# Patient Record
Sex: FEMALE | Race: BLACK OR AFRICAN AMERICAN | NOT HISPANIC OR LATINO | Employment: FULL TIME | URBAN - NONMETROPOLITAN AREA
[De-identification: names, ages, dates, MRNs, and addresses within clinical notes are randomized per-mention and may not be internally consistent; named-entity substitution may affect disease eponyms.]

---

## 2018-01-24 ENCOUNTER — OFFICE VISIT CONVERTED (OUTPATIENT)
Dept: FAMILY MEDICINE CLINIC | Age: 53
End: 2018-01-24
Attending: NURSE PRACTITIONER

## 2020-11-03 ENCOUNTER — HOSPITAL ENCOUNTER (OUTPATIENT)
Dept: OTHER | Facility: HOSPITAL | Age: 55
Discharge: HOME | End: 2020-11-03
Attending: FAMILY MEDICINE

## 2020-11-06 LAB — SARS-COV-2 RNA SPEC QL NAA+PROBE: NOT DETECTED

## 2021-03-01 ENCOUNTER — OFFICE VISIT (OUTPATIENT)
Dept: ORTHOPEDIC SURGERY | Facility: CLINIC | Age: 56
End: 2021-03-01

## 2021-03-01 VITALS — TEMPERATURE: 95.9 F | BODY MASS INDEX: 39.93 KG/M2 | WEIGHT: 217 LBS | HEIGHT: 62 IN

## 2021-03-01 DIAGNOSIS — M17.12 PRIMARY OSTEOARTHRITIS OF LEFT KNEE: Primary | Chronic | ICD-10-CM

## 2021-03-01 PROCEDURE — 99204 OFFICE O/P NEW MOD 45 MIN: CPT | Performed by: PHYSICIAN ASSISTANT

## 2021-03-01 RX ORDER — LORATADINE 10 MG/1
10 TABLET ORAL DAILY
COMMUNITY

## 2021-03-01 RX ORDER — MULTIPLE VITAMINS W/ MINERALS TAB 9MG-400MCG
1 TAB ORAL DAILY
COMMUNITY

## 2021-03-01 NOTE — PROGRESS NOTES
"Chief Complaint  Pain of the Left Knee and Establish Care    Subjective    History of Present Illness      Oriana Rogers is a 65 y.o. female who presents to [facility] for complaint of  Knee Pain:   Patient complains of left knee pain. There is no specific injury.  The pain began 6 months ago. The pain started as pain/tightness in the calf and later moving into the posterior aspect of the knee.   The pain is located over medial and posterior aspect.    She describes the symptoms as aching, shooting, stabbing and throbbing.   Symptoms improve with rest, ice, medication: aleve used and beneficial, the use of a brace/sleeve.   The symptoms are worse with stair climbing, sitting, working, walking.   The knee has given out or felt unstable.   She has been treated with a round of steroids which did not provide much relief. She reports feeling stiff and walking with a limp recently.   She has done PT for approximately 1 months with some improvement. She also reports swelling.  She is employed at [email or web address] and stands and walks all day.             Objective   Vital Signs:   Temp 95.9 °F (35.5 °C)   Ht 157.5 cm (62\")   Wt 98.4 kg (217 lb)   BMI 39.69 kg/m²     Physical Exam  Vitals signs and nursing note reviewed.   Constitutional:       Appearance: Normal appearance.   Pulmonary:      Effort: Pulmonary effort is normal.   Skin:     General: Skin is warm and dry.      Capillary Refill: Capillary refill takes less than 2 seconds.   Neurological:      General: No focal deficit present.      Mental Status: He is alert and oriented to person, place, and time. Mental status is at baseline.   Psychiatric:         Mood and Affect: Mood normal.         Behavior: Behavior normal.         Thought Content: Thought content normal.         Judgment: Judgment normal.     Ortho Exam   LEFT knee  There is mild-moderate joint line tenderness at the medial aspect of the knee.   Positive for varus " orientation of the knee.   Positive for crepitus throughout range of motion.   Negative for effusion.  Positive patellar grind test.   Negative Lachman test.    Negative anterior and posterior drawer.  Range of motion in extension and flexion is: 0-120 degrees.  Neurovascular status is intact.    Dorsalis pedis and posterior tibial artery pulses are palpable.    Common peroneal nerve function is well preserved.  Gait is cautious and antalgic.       Result Review :   Radiologic studies - see below for interpretation  LEFT knee xrays 3views were performed at [facility] on 9/29/21. These images were independently viewed and interpreted by myself, my impression as follows:   · Mild to moderate medial joint space narrowing, lateral joint space with mild degenerative change, patellofemoral also with mild degenerative change                    Assessment   Assessment and Plan    Problem List Items Addressed This Visit        Musculoskeletal and Injuries    Primary osteoarthritis of left knee - Primary    Relevant Orders    MRI Knee Left Without Contrast          Follow Up   · Discussion of any imaging in detail. Discussion of orthopaedic goals.  · Risk, benefits, and merits of treatment alternatives reviewed with the patient. Treatment alternatives include: oral anti-inflammatories/NSAID, intra-articular steroid injection, intra-articular visco supplementation, bracing and further imaging/testing. She would like to proceed with further testing with MRI.  · Schedule MRI to further assess articular cartilage loss/surgery planning.  · Ice, heat, and/or modalities as beneficial  · Patient is encouraged to call or return for any issues or concerns.  · Patient was given instructions and counseling regarding her condition or for health maintenance advice. Please see specific information pulled into the AVS if appropriate.     Ivan Palacios PA-C   Date of Encounter: 3/1/2021   Electronically signed by  Ivan Palacios PA-C, 03/01/21, 6:02 AM EST.     EMR Dragon/Transcription disclaimer:  Much of this encounter note is an electronic transcription/translation of spoken language to printed text. The electronic translation of spoken language may permit erroneous, or at times, nonsensical words or phrases to be inadvertently transcribed; Although I have reviewed the note for such errors, some may still exist.

## 2021-03-23 ENCOUNTER — HOSPITAL ENCOUNTER (OUTPATIENT)
Dept: OTHER | Facility: HOSPITAL | Age: 56
Discharge: HOME | End: 2021-03-23
Attending: PHYSICIAN ASSISTANT

## 2021-03-26 ENCOUNTER — OFFICE VISIT (OUTPATIENT)
Dept: ORTHOPEDIC SURGERY | Facility: CLINIC | Age: 56
End: 2021-03-26

## 2021-03-26 DIAGNOSIS — M17.12 PRIMARY OSTEOARTHRITIS OF LEFT KNEE: Primary | ICD-10-CM

## 2021-03-26 DIAGNOSIS — S83.242A OTHER TEAR OF MEDIAL MENISCUS OF LEFT KNEE, UNSPECIFIED WHETHER OLD OR CURRENT TEAR, INITIAL ENCOUNTER: ICD-10-CM

## 2021-03-26 PROCEDURE — 99442 PR PHYS/QHP TELEPHONE EVALUATION 11-20 MIN: CPT | Performed by: PHYSICIAN ASSISTANT

## 2021-03-26 NOTE — PROGRESS NOTES
You have chosen to receive care through a telephone visit. Do you consent to use a telephone visit for your medical care today? Yes     Chief Complaint  Results of the Left Knee    Subjective    History of Present Illness      Oriana Rogers is a 65 y.o. female who presents to [facility] via telephone visit for follow-up on left knee pain and MRI results of left knee.  I initially saw her 3/1/2021 for complaint of pain that began 6 months prior and started as pain and tightness in the calf with the later moving to the posterior aspect of the knee.  She reported aching and stabbing pain at the medial and posterior aspect of the knee.  She also reported knee giving out from underneath of her.  She has been treated with steroids which did not provide much relief and is done PT with some improvement.         Objective   Vital Signs:   There were no vitals taken for this visit.    Physical Exam  Musculoskeletal:      Comments: See detailed ortho exam from prior visit   Neurological:      Mental Status: She is alert and oriented to person, place, and time. Mental status is at baseline.   Psychiatric:         Mood and Affect: Mood normal.         Behavior: Behavior normal.         Thought Content: Thought content normal.         Judgment: Judgment normal.       Ortho Exam   RIGHT knee  There is mild-moderate joint line tenderness at the medial aspect of the knee.   Positive for varus orientation of the knee.   Positive for crepitus throughout range of motion.   Negative for effusion.  Positive patellar grind test.   Negative Lachman test.    Negative anterior and posterior drawer.  Range of motion in extension and flexion is: 0-120 degrees.  Neurovascular status is intact.    Dorsalis pedis and posterior tibial artery pulses are palpable.    Common peroneal nerve function is well preserved.  Gait is cautious and antalgic.   The above exam is historical and is used as a reference for this  telephone visit.      Result Review :   Radiologic studies - see below for interpretation  Reviewed MRI report of left knee, performed at  [facility] on 3/23/21, summary of impression below:  · Moderate joint effusion with diffuse synovial thickening and intra-articular debris  · Lobulated popliteal cyst and suggestive of partial cyst rupture  · Medial meniscus shows full-thickness radial tear through posterior root with a 3 to 4 mm medial protrusion of the meniscal body.  · Lateral meniscus intact  · Cruciate and collateral ligaments are intact  · Extensor mechanism intact  · Patellofemoral compartment shows diffuse grade 2-3 chondromalacia with multifocal full-thickness near full-thickness chondral fissuring at medial patellar facet and medial and central trochlea  · Medial compartment shows diffuse grade II-III chondromalacia with near full-thickness chondral thinning at the weightbearing portions of medial femoral condyle and medial tibial plateau  · Lateral compartment articular cartilage fairly maintained  · At the trochlea there are mild multifocal subchondral cystic changes  · There is tricompartmental osteophyte formation      Assessment   Assessment and Plan    Problem List Items Addressed This Visit        Musculoskeletal and Injuries    Primary osteoarthritis of left knee - Primary    Relevant Orders    External [facility] Surgical/Procedural Request    Urinalysis With Culture If Indicated -    Tear of medial meniscus of left knee          Follow Up   · Discussion of any imaging in detail. Discussion of orthopaedic goals.  · Risk, benefits, and merits of treatment alternatives reviewed with the patient. Treatment alternatives include: intra-articular steroid injection, intra-articular visco supplementation, bracing, physical therapy and eventual surgery.  Discussed that given the extent of arthritis at the location of the medial meniscus tear she would not be a great candidate  for an arthroscopic surgery but instead would be a better candidate for a knee replacement surgery.  Discussed the possibility of a unicondylar knee replacement versus total knee replacement.  In the meantime I would recommend intra-articular steroid injection, bracing and physical therapy until she is ready for a knee replacement.  · Ice, heat, and/or modalities as beneficial  · Patient is encouraged to call or return for any issues or concerns.  The patient was seen today for preoperative discussion.  The patient has been tried on over-the-counter and prescription NSAID's despite the risks of anti-inflammatory bleeding, peptic ulcers and erosive gastritis with short term benefit only.  Braces have been prescribed for mechanical support.  Patient has been participating in an exercise program specifically targeting joint pain relief with limited benefit. Intraarticular injections have been used periodically with some but not complete relief of pain.  Ambulation aids have also been utilized.    · The details of the surgical procedure were explained including the location of probable incisions and a description of the likely hardware/grafts to be used. The patient understands the likely convalescence after surgery as well as the rehabilitation required.  Also, we have thoroughly discussed with the patient the risks, benefits and alternatives to surgery.  Risks include but are not limited to the risk of infection, joint stiffness, limited range of motion, wound healing problems, scar tissue build up, myocardial infarction, stroke, blood clots (including DVT and/or pulmonary embolus along with the risk of death) neurologic and/or vascular injury, limb length discrepancy, fracture, dislocation, nonunion, malunion, continued pain and need for further surgery including hardware failure requiring revision.    • Patient was given instructions and counseling regarding her condition or for health maintenance advice. Please see  specific information pulled into the AVS if appropriate.   This visit has been rescheduled as a phone visit to comply with patient safety concerns in accordance with CDC recommendations. Total time of discussion was 13 minutes.     Ivan Palacios PA-C   Date of Encounter: 3/26/2021   Electronically signed by Ivan Palacios PA-C, 03/26/21, 12:36 PM EDT.     EMR Dragon/Transcription disclaimer:  Much of this encounter note is an electronic transcription/translation of spoken language to printed text. The electronic translation of spoken language may permit erroneous, or at times, nonsensical words or phrases to be inadvertently transcribed; Although I have reviewed the note for such errors, some may still exist.

## 2021-04-19 ENCOUNTER — OUTSIDE FACILITY SERVICE (OUTPATIENT)
Dept: ORTHOPEDIC SURGERY | Facility: CLINIC | Age: 56
End: 2021-04-19

## 2021-04-19 PROCEDURE — 20985 CPTR-ASST DIR MS PX: CPT | Performed by: ORTHOPAEDIC SURGERY

## 2021-04-19 PROCEDURE — 27447 TOTAL KNEE ARTHROPLASTY: CPT | Performed by: ORTHOPAEDIC SURGERY

## 2021-04-30 ENCOUNTER — OFFICE VISIT (OUTPATIENT)
Dept: ORTHOPEDIC SURGERY | Facility: CLINIC | Age: 56
End: 2021-04-30

## 2021-04-30 ENCOUNTER — HOSPITAL ENCOUNTER (OUTPATIENT)
Dept: OTHER | Facility: HOSPITAL | Age: 56
Discharge: HOME | End: 2021-04-30
Attending: PHYSICIAN ASSISTANT

## 2021-04-30 VITALS — TEMPERATURE: 97.1 F | BODY MASS INDEX: 39.93 KG/M2 | HEIGHT: 62 IN | WEIGHT: 217 LBS

## 2021-04-30 DIAGNOSIS — Z96.652 S/P TKR (TOTAL KNEE REPLACEMENT), LEFT: Primary | ICD-10-CM

## 2021-04-30 PROCEDURE — 99024 POSTOP FOLLOW-UP VISIT: CPT | Performed by: PHYSICIAN ASSISTANT

## 2021-04-30 RX ORDER — FERROUS SULFATE 325(65) MG
1 TABLET ORAL DAILY
COMMUNITY
Start: 2021-04-20

## 2021-04-30 RX ORDER — OXYCODONE HYDROCHLORIDE AND ACETAMINOPHEN 5; 325 MG/1; MG/1
TABLET ORAL
Qty: 40 TABLET | Refills: 0 | Status: SHIPPED | OUTPATIENT
Start: 2021-04-30

## 2021-04-30 RX ORDER — RIVAROXABAN 10 MG/1
TABLET, FILM COATED ORAL
COMMUNITY
Start: 2021-04-20

## 2021-04-30 NOTE — PROGRESS NOTES
POST OPERATIVE FOLLOW UP (Global)      NAME: Oriana Rogers           : 1965    MRN: [id number]      Chief Complaint   Patient presents with   • Left Knee - Follow-up, Pain   • Post-op Knee     Date of Surgery: 2021  ?     HPI  Oriana Rogers presents for 11 day postoperative follow up s/p left total knee arthroplasty performed by Ghassan Gibbs MD. The patient has had a relatively normal postoperative course.  The patient has had no current complaints. The patient has had improving normal postoperative pain.  The patient has had no issues with the wound.         Physical Exam  General: alert, appears stated age, cooperative and no distress  Incision/Skin: healing well, no drainage, no erythema, no seroma, no swelling, incision well approximated  Musculoskeletal:   Left knee  Calf is soft and nontender with a negative Homans sign. Appropriate amounts of swelling and bruising are noted.  There is no clicking, popping or catching. There is no instability of the components.   Anterior and posterior drawer signs are negative.   Dorsalis pedis and posterior tibial artery pulses are palpable.   Common peroneal nerve function is well preserved.   Range of motion is 5-90 degrees of flexion.  Gait is cautious but otherwise fairly normal.       Diagnostic Data:  Left knee xrays 2 views were ordered by Ivan Palacios PA-C. Performed at [facility] on 2021. Images were independently viewed and interpreted by myself, my impression as follows:  Findings: Well positioned implant with good cement mantle  Bony Lesion: No  Soft tissues: increased  Joint spaces: WNL  Hardware appropriately positioned: yes  Prior studies available for comparison: yes          Assessment/Plan   Assessment:    1. S/P TKR (total knee replacement), left          Plan:       • Wound care instructions given  • Ice and/or modalities as beneficial  • Watch for signs and symptoms of  infection  • Elevate leg for residual swelling  • Physical therapy program ongoing  • Weight bearing parameters reviewed  • Take prescribed medications as instructed, but only as tolerated  • Aftercare and dental prophylaxis for joint replacement surgery.  • Discussion of orthopaedic goals and activities and patient and/or guardian expressed appreciation.  • Follow up in 4-6 weeks  • She needs to have hemoglobin checked by PCP to determine if she can discontinue the iron.       Ivan Palacios PA-C  04/30/2021     Electronically signed by Ivan Palacios PA-C, 04/30/21, 6:23 AM EDT.    EMR Dragon/Transcription disclaimer:  Much of this encounter note is an electronic transcription/translation of spoken language to printed text. The electronic translation of spoken language may permit erroneous, or at times, nonsensical words or phrases to be inadvertently transcribed; Although I have reviewed the note for such errors, some may still exist.

## 2021-05-03 ENCOUNTER — TELEPHONE (OUTPATIENT)
Dept: ORTHOPEDIC SURGERY | Facility: CLINIC | Age: 56
End: 2021-05-03

## 2021-05-03 NOTE — TELEPHONE ENCOUNTER
Caller: MRS SAWYER     Relationship: PATIENT     Best call back number: [phone]    What form or medical record are you requesting: FMLA     Who is requesting this form or medical record from you: WORK/[employer]     How would you like to receive the form or medical records (pick-up, mail, fax):   If fax, what is the fax number: [phone]  If mail, what is the address:   If pick-up, provide patient with address and location details    Timeframe paperwork needed: ASAP     Additional notes: Provider:      PATIENT CALLED IN REGARDING HER FMLA PAPER WORK TO [employer] PATIENT CALLED TO GIVE A FAX NUMBER TO FAX THESE PAPERS OVER, PATIENT SAID SHE DIDN'T THINK SHE GAVE THE OFFICE THE FAX NUMBER AND SHE ALSO GAVE US SOME OTHER INFO PERTAINING TO THE THE CLAIM AS WELL.  CLAIM# [id number] GROUP INS PLAN# [id number]  FAX# [phone]

## 2021-05-04 ENCOUNTER — TELEPHONE (OUTPATIENT)
Dept: ORTHOPEDIC SURGERY | Facility: CLINIC | Age: 56
End: 2021-05-04

## 2021-05-04 NOTE — TELEPHONE ENCOUNTER
RETURNED CALL TO [employer] AT [employer] (SHE LEFT ME A VOICEMAIL ASKING QUESTIONS TO PROCESS DISABILITY) AND LEFT INFORMATION ON HER CONFIDENTIAL VOICEMAIL. ANSWERED THE FOLLOWING QUESTIONS TO PROCESS MS SAWYER'S DISABILITY ([employer]) CLAIM.    1. ICD-10 CODE- M17.12  2. 1ST DATE WE TREATED FOR THIS CONDITION-3/1/21  3. TYPE OF SURGERY PERFORMED- LEFT TOTAL KNEE REPLACEMENT  4. NEXT APPOINTMENT- 6/3/21

## 2021-05-18 NOTE — PROGRESS NOTES
"Oriana Rogers 1965     Office/Outpatient Visit    Visit Date: Wed, Jan 24, 2018 03:34 pm    Provider: Monique Lepe N.P. (Assistant: Monse Anna MA)    Location: [facility]        Electronically signed by Monique Lepe N.P. on  01/24/2018 05:20:45 PM                             SUBJECTIVE:        CC:     Ms. Rogers is a 52 year old Black or  female.  This is her first visit to the clinic.  est.care, tower physical;         HPI:         HEALTH RISK PROFILE (\"At Risk\" items are starred):     Smoking: Life-long non-smoker;     Cancer Screening: occ BSE;  Current with screening mammograms;     Current with Pap smears with no history of abnormal results; Sofia Mitchell is her GYN; ;     Immunization Status: unsure;     Nutrition: ** Weight is above the healthy range for height;     Physical Activity: ** Exercises infrequently;     Alcohol/Drug Use: Rarely drinks alcohol; No illicit drug use;     Safety: Always wears seatbelt;     ROS:     CONSTITUTIONAL:  Negative for chills and fever.      EYES:  Negative for blurred vision.      E/N/T:  Negative for diminished hearing and nasal congestion.      CARDIOVASCULAR:  Negative for chest pain and palpitations.      RESPIRATORY:  Negative for recent cough and dyspnea.      GASTROINTESTINAL:  Negative for abdominal pain, nausea and vomiting.      MUSCULOSKELETAL:  Positive for back pain ( chronic ).   Negative for arthralgias or myalgias.  goes to chiropractor and it helps, intermittent issues     INTEGUMENTARY/BREAST:  Negative for atypical mole(s) and rash.      NEUROLOGICAL:  Negative for paresthesias and weakness.      PSYCHIATRIC:  Positive for víctor depression ( (controlled with zoloft) ) and insomnia.          PMH/FMH/SH:     Last Reviewed on 1/24/2018 03:50 PM by Monique Lepe    Past Medical History:                 PAST MEDICAL HISTORY         Depression: dx'd in 2007; controlled with zoloft;         GYNECOLOGICAL " HISTORY:    G0    Menopause at age 51.          Surgical History:         Cholecystectomy: laparoscopic; 2009; Other Surgeries    left wrist cyst;     COLONOSCOPY: was last done  with normal results Zachary         Family History:     Father:  at age 55; Cause of death was cancer of larynx     Mother:  at age 51;  Myocardial Infarction;  COPD     Brother(s): 3 brother(s) total;  Hyperlipidemia; Myocardial Infarction;  Type 2 Diabetes     Sister(s): Healthy; 1 sister(s) total         Social History:     Occupation: [employer]     Marital Status: Single     Children: None         Tobacco/Alcohol/Supplements:     Last Reviewed on 2018 03:44 PM by Monse Anna    Tobacco: She has never smoked.              Immunizations:     None        Allergies:     Last Reviewed on 2018 03:42 PM by Monse Anna      No Known Drug Allergies.         Current Medications:     Last Reviewed on 2018 03:44 PM by Monse Anna    Zoloft 50mg Tablet 1 a day     Loratadine 10mg Tablet 1 tab daily     Multivitamins         OBJECTIVE:        Vitals:         Current: 2018 3:41:58 PM    Ht:  5 ft, 1.5 in;  Wt: 222 lbs;  BMI: 41.3    T: 97.4 F (oral);  BP: 128/75 mm Hg (left arm, sitting);  P: 78 bpm (left arm (BP Cuff), sitting)        Exams:     PHYSICAL EXAM:     GENERAL: vital signs recorded - well developed, well nourished;  no apparent distress;     EYES: extraocular movements intact; conjunctiva and cornea are normal; PERRLA;     E/N/T:  normal EACs, TMs, nasal/oral mucosa, teeth, gingiva, and oropharynx;     NECK: range of motion is normal; thyroid is non-palpable;     RESPIRATORY: normal respiratory rate and pattern with no distress; normal breath sounds with no rales, rhonchi, wheezes or rubs;     CARDIOVASCULAR: normal rate; rhythm is regular;  no systolic murmur; no edema;     GASTROINTESTINAL: nontender; normal bowel sounds; no organomegaly;     LYMPHATIC: no enlargement of  cervical or facial nodes;     BREAST/INTEGUMENT: SKIN: no significant rashes or lesions; no suspicious moles;     MUSCULOSKELETAL:  Normal range of motion, strength and tone;     NEUROLOGIC: GROSSLY INTACT     PSYCHIATRIC:  appropriate affect and demeanor; normal speech pattern; grossly normal memory;         ASSESSMENT           V70.0   Z00.00  Annual exam              DDx:         ORDERS:         Procedures Ordered:       REFER  Referral to Specialist or Other Facility  (Send-Out)                   PLAN:          Annual exam send for colon screen done last year by Dr. Sharma /order given for an adacel, she will check with her insurance regarding coverage /reviewed her labs, Total cholesterol elevated and her weight         COUNSELING provided on: breast self-exam, healthy eating habits, regular exercise, weight loss, and ADVISED TO SEE AN EYE DOCTOR AND DENTIST REGULARLY.      FOLLOW-UP: Schedule a follow-up visit in 3 months.            Patient Education Handouts:       Physical Exam 50-59 year, Female              Other Orders:       REFER  Referral to Specialist or Other Facility  (Send-Out)           Patient Recommendations:        For  Annual exam:         You should regularly examine your breasts, easily done while in the shower or with lotion.  Feel and look for differences in consistency from month to month, especially noting knots or lumps, changes in skin appearance, nipple retraction or discharge.    Limit dietary intake of fat (especially saturated fat) and cholesterol.  Eat a variety of foods, including plenty of fruits, vegetables, and grain containg fiber, limit fat intake to 30% of total calories. Balance caloric intake with energy expended.    Maintaining regular physical activity is advised to help prevent heart disease, hypertension, diabetes, and obesity.  Schedule a follow-up visit in 3 months.              CHARGE CAPTURE           **Please note: ICD descriptions below are intended for  billing purposes only and may not represent clinical diagnoses**        Primary Diagnosis:         V70.0 Annual exam            Z00.00    Encntr for general adult medical exam w/o abnormal findings              Orders:          [id number]   Preventive medicine, new patient, age 40-64 years  (In-House)

## 2021-05-19 ENCOUNTER — TELEPHONE (OUTPATIENT)
Dept: ORTHOPEDIC SURGERY | Facility: CLINIC | Age: 56
End: 2021-05-19

## 2021-05-19 DIAGNOSIS — Z96.652 S/P TKR (TOTAL KNEE REPLACEMENT), LEFT: Primary | ICD-10-CM

## 2021-05-19 NOTE — TELEPHONE ENCOUNTER
EVELYNE WITH [facility] CALLED REQUESTING PHYSICAL THERAPY ORDER FOR PATIENT.  SHE ALSO STATED THAT THE THERAPIST BELIEVES PATIENT WOULD BENEFIT FROM A DYNASPLINT FOR EXTENSION.  PLEASE FAX ORDER [phone].      PATIENT IS S/P LEFT TOTAL KNEE REPLACEMENT ON 4/19/21

## 2021-05-21 DIAGNOSIS — T84.82XA ARTHROFIBROSIS OF TOTAL KNEE REPLACEMENT, INITIAL ENCOUNTER (HCC): ICD-10-CM

## 2021-05-21 DIAGNOSIS — Z96.652 S/P TKR (TOTAL KNEE REPLACEMENT), LEFT: Primary | ICD-10-CM

## 2021-06-03 ENCOUNTER — OFFICE VISIT (OUTPATIENT)
Dept: ORTHOPEDIC SURGERY | Facility: CLINIC | Age: 56
End: 2021-06-03

## 2021-06-03 VITALS — BODY MASS INDEX: 39.93 KG/M2 | TEMPERATURE: 98 F | HEIGHT: 62 IN | WEIGHT: 217 LBS

## 2021-06-03 DIAGNOSIS — Z96.652 S/P TKR (TOTAL KNEE REPLACEMENT), LEFT: Primary | ICD-10-CM

## 2021-06-03 PROCEDURE — 99024 POSTOP FOLLOW-UP VISIT: CPT | Performed by: ORTHOPAEDIC SURGERY

## 2021-06-03 NOTE — PROGRESS NOTES
POST OP TOTAL GLOBAL      NAME: Oriana Rogers           : 1965    MRN: [id number]    Chief Complaint   Patient presents with   • Left Knee - Follow-up, Pain   • Post-op Knee       Date of Surgery: 2021  ?  HPI:   Patient returns today for 6 week follow up of left total knee arthroplasty Incision(s) healed nicely with no signs of infection. Patient reports doing well with no unusual complaints. No fevers, rigors or chills. Appears to be progressing appropriately. Patient is on appropriate anticoagulation.  She is continuing with her physical therapy at [facility] per her choice.  She is a little bit behind and has a slight extensor lag.  We have discussed that at length today in the office.  I would recommend getting a Dynasplint to overcome the extensor lag and the patient is willing to go along with that.  Her flexion is doing quite well at this point.      Ortho Exam:   Left knee.The patient is status post total knee arthroplasty postoperative 6 week(s). Incision is clean. Calf is soft and nontender. Homans sign is negative. There is no clicking, popping or catching. Anterior and posterior drawer signs are negative.  There is no instability of the components. Appropriate amounts of swelling and bruising are noted. Dorsalis pedis and posterior tibial artery pulses are palpable. Common peroneal nerve function is well preserved. Range of motion is from 5-105 degrees of flexion. Gait is cautious but otherwise fairly normal. There is no evidence of a deep seated joint infection.      Diagnostic Studies:  Left knee xrays ap/lateral views were ordered by Ghassan Gibbs MD. Performed at [facility] on 2021. Images were independently viewed and interpreted by myself, my impression as follows:    left Knee X-Ray  Indication: Evaluation of implant position after total knee arthroplasty.  AP, Lateral views  Findings: Well-placed implants with a good cement mantle without any  subsidence of the implants.  no bony lesion  Soft tissues within normal limits  within normal limits joint spaces  Hardware appropriately positioned yes      yes prior studies available for comparison.      X-RAY was ordered and reviewed by Ghassan Gibbs MD         Assessment:  Diagnoses and all orders for this visit:    1. S/P TKR (total knee replacement), left (Primary)            Plan   · Continue physical therapy at [facility]   · To use ACE wrap/DELANO stocking  · Continue ice to joint   · Stretching and strengthening exercises of the quads and the hamstrings.  · Discussed with the patient about using a [place] brace dynamic brace to help overcome the last 5 to 7 degrees of the extensor lag.  The patient is agreeable to go along with that.  · Aggressive ROM  · Falls precautions  · You may now resume any prescription medications you were taking prior to surgery  · Continue with lifelong antibiotic prophylaxis with dental procedures following total joint replacement.  · Follow up in 6 week(s)    Date of encounter: 06/03/2021   Ghassan Gibbs MD

## 2021-06-03 NOTE — PROGRESS NOTES
"Chief Complaint  Follow-up and Pain of the Left Knee and Post-op Knee    Subjective    History of Present Illness {CC  Problem List  Visit Diagnosis   Encounters  Notes  Medications  Labs  Result Review Imaging  Media :23}     Oriana Rogers is a 55 y.o. female who presents to [facility] for   History of Present Illness   Pain Location: {AS Body Parts:49483}  Radiation: {asradiationpain:19036}  Quality: {asquality:45621}  Intensity/Severity: {asseveritypain:04896}  Duration: {Time:29223}  Progression of symptoms: {Stucker yes no:68542}  Onset quality: {asonsetquality:34018}  Timing: {astimin}  Aggravating Factors: {AS Aggravating Factors Ortho:39607}  Alleviating Factors: {AS Alleviating Factors:55473}  Previous Episodes: {aspreviousepisodes:10755}  Associated Symptoms: {asassociatedsymptoms:82216}  ADLs Affected: {ADL ASMEH:02609}  Previous Treatment: {AS previous treatment:20025}       Objective   Vital Signs:   Temp 98 °F (36.7 °C)   Ht 157.5 cm (62.01\")   Wt 98.4 kg (217 lb)   BMI 39.68 kg/m²     Physical Exam  Physical Exam  Vitals signs and nursing note reviewed.   Constitutional:       Appearance: Normal appearance.   Pulmonary:      Effort: Pulmonary effort is normal.   Skin:     General: Skin is warm and dry.      Capillary Refill: Capillary refill takes less than 2 seconds.   Neurological:      General: No focal deficit present.      Mental Status: He is alert and oriented to person, place, and time. Mental status is at baseline.   Psychiatric:         Mood and Affect: Mood normal.         Behavior: Behavior normal.         Thought Content: Thought content normal.         Judgment: Judgment normal.     Ortho Exam   {Musculoskeletal Exams:23975}    {Post Op Total Joint Arthroplasty Exam:62864}    {Post Op Detailed Exam:23981}        Result Review :{ Labs  Result Review  Imaging  Med Tab  Media :23}   The following data was reviewed by: Tracy Noguera, " MA on 06/03/2021:  {Data reviewed (optional):76127}  {Diagnostics options AS:85579}            Procedures           Assessment   Assessment and Plan {CC Problem List  Visit Diagnosis  ROS  Review (Popup)  Health Maintenance  Quality  BestPractice  Medications  SmartSets  SnapShot Encounters  Media :23}   There are no diagnoses linked to this encounter.    {Time Spent (Optional):90082}  Follow Up {Instructions Charge Capture  Follow-up Communications :23}  · Compression/brace  · Rest, ice, compression, and elevation (RICE) therapy  · Stretching and strengthening exercises  · OTC {OTC pain:31148}  · Follow up in *** {WEEKS/MONTHS:13675}  • Patient was given instructions and counseling regarding her condition or for health maintenance advice. Please see specific information pulled into the AVS if appropriate.     Tracy Noguera MA   Date of Encounter: 6/3/2021   Electronically signed by Tracy Noguera MA, 06/03/21, 10:07 AM EDT.     EMR Dragon/Transcription disclaimer:  Much of this encounter note is an electronic transcription/translation of spoken language to printed text. The electronic translation of spoken language may permit erroneous, or at times, nonsensical words or phrases to be inadvertently transcribed; Although I have reviewed the note for such errors, some may still exist.

## 2021-06-14 DIAGNOSIS — Z96.652 S/P TKR (TOTAL KNEE REPLACEMENT), LEFT: Primary | ICD-10-CM

## 2021-06-14 RX ORDER — AMOXICILLIN 500 MG/1
CAPSULE ORAL
Qty: 4 CAPSULE | Refills: 0 | Status: SHIPPED | OUTPATIENT
Start: 2021-06-14

## 2021-06-14 NOTE — TELEPHONE ENCOUNTER
Patient is having a dental procedure tomorrow, needs a prophylactic antibiotic sent in.  Order generated and ready for ok and signature by provider.

## 2021-07-01 VITALS
TEMPERATURE: 97.4 F | SYSTOLIC BLOOD PRESSURE: 128 MMHG | DIASTOLIC BLOOD PRESSURE: 75 MMHG | BODY MASS INDEX: 40.85 KG/M2 | WEIGHT: 222 LBS | HEART RATE: 78 BPM | HEIGHT: 62 IN

## 2021-07-15 ENCOUNTER — OFFICE VISIT (OUTPATIENT)
Dept: ORTHOPEDIC SURGERY | Facility: CLINIC | Age: 56
End: 2021-07-15

## 2021-07-15 VITALS — TEMPERATURE: 97.6 F | HEIGHT: 62 IN | BODY MASS INDEX: 39.93 KG/M2 | WEIGHT: 217 LBS

## 2021-07-15 DIAGNOSIS — Z96.652 S/P TKR (TOTAL KNEE REPLACEMENT), LEFT: Primary | ICD-10-CM

## 2021-07-15 PROCEDURE — 99024 POSTOP FOLLOW-UP VISIT: CPT | Performed by: ORTHOPAEDIC SURGERY

## 2021-07-15 NOTE — PROGRESS NOTES
"POST OP TOTAL GLOBAL      NAME: Oriana Rogers           : 1965    MRN: [id number]    Chief Complaint   Patient presents with   • Left Knee - Follow-up, Pain   • Post-op Knee       Date of Surgery: Patient had left total knee arthroplasty performed by me on 2021.  ?  HPI:   Patient returns today for 3 month follow up of left total knee arthroplasty Incision(s) healed nicely with no signs of infection. Patient reports doing well with no unusual complaints. No fevers, rigors or chills. Appears to be progressing appropriately. Patient is on appropriate anticoagulation.  She is doing extremely well postoperatively.  In fact she states \"my new knee is absolutely wonderful \".  The patient states that she would like to return back to her job at [employer] soon.      Ortho Exam:   Left knee.The patient is status post total knee arthroplasty postoperative 3 month(s). Incision is clean. Calf is soft and nontender. Homans sign is negative. There is no clicking, popping or catching. Anterior and posterior drawer signs are negative.  There is no instability of the components. Appropriate amounts of swelling and bruising are noted. Dorsalis pedis and posterior tibial artery pulses are palpable. Common peroneal nerve function is well preserved. Range of motion is from 5-105 degrees of flexion. Gait is cautious but otherwise fairly normal. There is no evidence of a deep seated joint infection.      Diagnostic Studies:  no diagnostic testing performed this visit        Assessment:  Diagnoses and all orders for this visit:    1. S/P TKR (total knee replacement), left (Primary)            Plan     · To use ACE wrap/DELANO stocking  · Continue ice to joint   · Stretching and strengthening exercises  · Aggressive ROM of the quads and the hamstrings.  · Patient has been given 2 more weeks off work before she returns back to her regular duty at [employer].  · Return to work statement given to the patient for 2 " August 2021.  · Falls precautions  · You may now resume any prescription medications you were taking prior to surgery  · Continue with lifelong antibiotic prophylaxis with dental procedures following total joint replacement.  · Follow up in 1 year(s) with x-rays    Date of encounter: 07/15/2021   Jaycee Quan MA

## 2021-07-15 NOTE — PROGRESS NOTES
"Chief Complaint  Follow-up and Pain of the Left Knee and Post-op Knee    Subjective    History of Present Illness {CC  Problem List  Visit Diagnosis   Encounters  Notes  Medications  Labs  Result Review Imaging  Media :23}     Oriana Rogers is a 55 y.o. female who presents to [facility] for   History of Present Illness   Pain Location: {AS Body Parts:81732}  Radiation: {asradiationpain:20628}  Quality: {asquality:95570}  Intensity/Severity: {asseveritypain:17742}  Duration: {Time:25457}  Progression of symptoms: {Stucker yes no:35009}  Onset quality: {asonsetquality:43459}  Timing: {astimin}  Aggravating Factors: {AS Aggravating Factors Ortho:05642}  Alleviating Factors: {AS Alleviating Factors:01916}  Previous Episodes: {aspreviousepisodes:43710}  Associated Symptoms: {asassociatedsymptoms:45800}  ADLs Affected: {ADL ASMEH:01580}  Previous Treatment: {AS previous treatment:70274}       Objective   Vital Signs:   Temp 97.6 °F (36.4 °C)   Ht 157.5 cm (62.01\")   Wt 98.4 kg (217 lb)   BMI 39.68 kg/m²     Physical Exam  Physical Exam  Vitals signs and nursing note reviewed.   Constitutional:       Appearance: Normal appearance.   Pulmonary:      Effort: Pulmonary effort is normal.   Skin:     General: Skin is warm and dry.      Capillary Refill: Capillary refill takes less than 2 seconds.   Neurological:      General: No focal deficit present.      Mental Status: He is alert and oriented to person, place, and time. Mental status is at baseline.   Psychiatric:         Mood and Affect: Mood normal.         Behavior: Behavior normal.         Thought Content: Thought content normal.         Judgment: Judgment normal.     Ortho Exam   {Musculoskeletal Exams:89504}    {Post Op Total Joint Arthroplasty Exam:55553}    {Post Op Detailed Exam:69399}        Result Review :{ Labs  Result Review  Imaging  Med Tab  Media :23}   The following data was reviewed by: Tracy Noguera, " MA on 07/15/2021:  {Data reviewed (optional):08238}  {Diagnostics options AS:70390}            Procedures           Assessment   Assessment and Plan {CC Problem List  Visit Diagnosis  ROS  Review (Popup)  Health Maintenance  Quality  BestPractice  Medications  SmartSets  SnapShot Encounters  Media :23}   There are no diagnoses linked to this encounter.    {Time Spent (Optional):84620}  Follow Up {Instructions Charge Capture  Follow-up Communications :23}  · Compression/brace  · Rest, ice, compression, and elevation (RICE) therapy  · Stretching and strengthening exercises  · OTC {OTC pain:64033}  · Follow up in *** {WEEKS/MONTHS:75407}  • Patient was given instructions and counseling regarding her condition or for health maintenance advice. Please see specific information pulled into the AVS if appropriate.     Tracy Noguera MA   Date of Encounter: 7/15/2021   Electronically signed by Tracy Noguera MA, 07/15/21, 12:56 PM EDT.     EMR Dragon/Transcription disclaimer:  Much of this encounter note is an electronic transcription/translation of spoken language to printed text. The electronic translation of spoken language may permit erroneous, or at times, nonsensical words or phrases to be inadvertently transcribed; Although I have reviewed the note for such errors, some may still exist.

## 2021-07-19 ENCOUNTER — TELEPHONE (OUTPATIENT)
Dept: ORTHOPEDIC SURGERY | Facility: CLINIC | Age: 56
End: 2021-07-19

## 2021-07-19 NOTE — TELEPHONE ENCOUNTER
That will be just fine to extend her time off work. Please send her in the note as requested by her with those dates mentioned. Thank you

## 2021-07-19 NOTE — TELEPHONE ENCOUNTER
Patient states she was seen last week and she is asking to return to work on August 23rd instead of August 2nd.  She states she stands all day at work.

## 2021-07-28 ENCOUNTER — TELEPHONE (OUTPATIENT)
Dept: ORTHOPEDIC SURGERY | Facility: CLINIC | Age: 56
End: 2021-07-28

## 2021-07-28 NOTE — TELEPHONE ENCOUNTER
Caller: VIRY SAWYER    Relationship: SELF    Best call back number: [phone]    What form or medical record are you requesting: [employer] HAS PAPER WORK COMPLETE AND WAS ONLY WAITING ON FAX NUMBER FOR IT TO BE SENT    Who is requesting this form or medical record from you: LORNA    How would you like to receive the form or medical records (pick-up, mail, fax): FAX  If fax, what is the fax number: [phone]      Additional notes: RETURN TO WORK WITH LORNA NEEDS MORE INFORMATION ABOUT DISABILITY ANY PAYMENTS AFTER 8/8/21 ARE PENDING MEDICAL REVIEW.

## 2022-07-18 DIAGNOSIS — M25.561 RIGHT KNEE PAIN, UNSPECIFIED CHRONICITY: ICD-10-CM

## 2022-07-18 DIAGNOSIS — Z96.652 S/P TKR (TOTAL KNEE REPLACEMENT), LEFT: Primary | ICD-10-CM

## 2022-07-26 ENCOUNTER — HOSPITAL ENCOUNTER (OUTPATIENT)
Dept: GENERAL RADIOLOGY | Facility: HOSPITAL | Age: 57
Discharge: HOME | End: 2022-07-26

## 2022-07-26 DIAGNOSIS — Z96.652 S/P TKR (TOTAL KNEE REPLACEMENT), LEFT: ICD-10-CM

## 2022-07-26 DIAGNOSIS — M25.561 RIGHT KNEE PAIN, UNSPECIFIED CHRONICITY: ICD-10-CM

## 2022-07-26 PROCEDURE — 73560 X-RAY EXAM OF KNEE 1 OR 2: CPT

## 2022-07-27 ENCOUNTER — OFFICE VISIT (OUTPATIENT)
Dept: ORTHOPEDIC SURGERY | Facility: CLINIC | Age: 57
End: 2022-07-27

## 2022-07-27 VITALS — HEIGHT: 62 IN | TEMPERATURE: 97.2 F | WEIGHT: 220 LBS | BODY MASS INDEX: 40.48 KG/M2

## 2022-07-27 DIAGNOSIS — M17.12 PRIMARY OSTEOARTHRITIS OF LEFT KNEE: ICD-10-CM

## 2022-07-27 DIAGNOSIS — Z96.652 S/P TKR (TOTAL KNEE REPLACEMENT), LEFT: Primary | ICD-10-CM

## 2022-07-27 DIAGNOSIS — M17.11 PRIMARY OSTEOARTHRITIS OF RIGHT KNEE: ICD-10-CM

## 2022-07-27 PROCEDURE — 99213 OFFICE O/P EST LOW 20 MIN: CPT | Performed by: ORTHOPAEDIC SURGERY

## 2022-08-05 PROBLEM — M17.11 PRIMARY OSTEOARTHRITIS OF RIGHT KNEE: Status: ACTIVE | Noted: 2022-08-05
